# Patient Record
Sex: FEMALE | Race: WHITE | Employment: OTHER | URBAN - METROPOLITAN AREA
[De-identification: names, ages, dates, MRNs, and addresses within clinical notes are randomized per-mention and may not be internally consistent; named-entity substitution may affect disease eponyms.]

---

## 2020-10-21 ENCOUNTER — LAB ENCOUNTER (OUTPATIENT)
Dept: LAB | Age: 65
End: 2020-10-21
Payer: COMMERCIAL

## 2020-10-21 DIAGNOSIS — E07.9 DISORDER OF THYROID: Primary | ICD-10-CM

## 2020-10-21 PROCEDURE — 84443 ASSAY THYROID STIM HORMONE: CPT

## 2020-10-21 PROCEDURE — 36415 COLL VENOUS BLD VENIPUNCTURE: CPT

## 2021-01-26 PROBLEM — E03.9 ACQUIRED HYPOTHYROIDISM: Status: ACTIVE | Noted: 2021-01-26

## 2021-01-26 PROBLEM — Z52.008 BLOOD DONOR: Status: ACTIVE | Noted: 2021-01-26

## 2021-07-30 ENCOUNTER — TELEPHONE (OUTPATIENT)
Dept: ORTHOPEDICS CLINIC | Facility: CLINIC | Age: 66
End: 2021-07-30

## 2021-07-30 DIAGNOSIS — M25.552 HIP PAIN, LEFT: Primary | ICD-10-CM

## 2021-07-30 NOTE — TELEPHONE ENCOUNTER
New patient with left groin pain for about 6 months. She has not had any imaging. Please order xrays.

## 2021-09-09 ENCOUNTER — OFFICE VISIT (OUTPATIENT)
Dept: ORTHOPEDICS CLINIC | Facility: CLINIC | Age: 66
End: 2021-09-09
Payer: MEDICARE

## 2021-09-09 VITALS — HEIGHT: 68 IN | BODY MASS INDEX: 20.46 KG/M2 | WEIGHT: 135 LBS

## 2021-09-09 DIAGNOSIS — M76.892 ADDUCTOR TENDINITIS OF LEFT HIP: Primary | ICD-10-CM

## 2021-09-09 DIAGNOSIS — M16.12 PRIMARY OSTEOARTHRITIS OF LEFT HIP: ICD-10-CM

## 2021-09-09 PROCEDURE — 3008F BODY MASS INDEX DOCD: CPT | Performed by: ORTHOPAEDIC SURGERY

## 2021-09-09 PROCEDURE — 99203 OFFICE O/P NEW LOW 30 MIN: CPT | Performed by: ORTHOPAEDIC SURGERY

## 2021-09-09 NOTE — PROGRESS NOTES
[facility] New Patient Note    CC: Patient presents with:  Groin Pain: Patient is here for left keshav pain. Patient states that she has had a history of this problem but the pain started getting worse about 3 months ago.        HPI: The patient i

## 2021-09-09 NOTE — PROGRESS NOTES
[facility] New Patient Note    CC: Patient presents with:  Groin Pain: Patient is here for left keshav pain. Patient states that she has had a history of this problem but the pain started getting worse about 3 months ago.        HPI: The patient i lb (61.2 kg)   BMI 20.53 kg/m²   On examination she is a thin healthy-appearing 51-year-old female in no distress. Gait is nonantalgic. No Trendelenburg sign is noted. There is excellent lumbar flexibility without tenderness.   Hip range of motion is walt temperature to participate in activities that may prolong her condition. Unfortunately it may be several months before symptoms subside. Patient expressed understanding and appreciation and will modify her activities in hopes of steady improvement.   If s

## 2021-09-15 ENCOUNTER — OFFICE VISIT (OUTPATIENT)
Dept: PODIATRY CLINIC | Facility: CLINIC | Age: 66
End: 2021-09-15
Payer: MEDICARE

## 2021-09-15 DIAGNOSIS — M79.674 TOE PAIN, RIGHT: ICD-10-CM

## 2021-09-15 DIAGNOSIS — L84 PRE-ULCERATIVE CORN OR CALLOUS: ICD-10-CM

## 2021-09-15 DIAGNOSIS — M20.41 HAMMER TOES OF BOTH FEET: Primary | ICD-10-CM

## 2021-09-15 DIAGNOSIS — M20.42 HAMMER TOES OF BOTH FEET: Primary | ICD-10-CM

## 2021-09-15 PROCEDURE — 99203 OFFICE O/P NEW LOW 30 MIN: CPT | Performed by: PODIATRIST

## 2021-09-15 NOTE — PROGRESS NOTES
Taina Diamond is a 77year old female. Patient presents with:  New Patient: right 5th toe corn is painful with shoes. pain 8/10 when wearing shoes.         HPI:     This 31-year-old female patient presents clinic today complaining of a painful corn Exam   GENERAL: well developed, well nourished, in no apparent distress  EXTREMITIES:   1. Integument: Normal skin temperature and turgor. Minimal preulcerative (grade 0) hyperkeratotic buildup is noted at the dorsal lateral aspect of her fifth digit.   No discontinue its use with any associated irritation/pressure/pain/other issues. Patient would like to consider surgical correction of the toe. She will make an appointment for 1 month for surgical consult with Dr. Shannan Gonzalez or Dr Misty Rojas.   She will monitor th

## 2021-09-20 ENCOUNTER — TELEPHONE (OUTPATIENT)
Dept: ORTHOPEDICS CLINIC | Facility: CLINIC | Age: 66
End: 2021-09-20

## 2021-09-20 DIAGNOSIS — M76.892 ADDUCTOR TENDINITIS OF LEFT HIP: Primary | ICD-10-CM

## 2021-09-20 NOTE — TELEPHONE ENCOUNTER
· Pt is requesting a referral for PT  · She was told she did not need one but PT is asking for one  · Fax: [phone] - [facility]

## 2021-09-24 NOTE — TELEPHONE ENCOUNTER
Halima HU PA-C  You 22 hours ago (9:28 AM)       Juan Pablo Kendrick,     PT is fine, 2 times a week for 4 weeks for ROM, strengthening, modalities and HEP.  Thanks!     Message text      PT orders placed per above and faxed to pt's preferred [facility] facility

## 2022-02-17 PROBLEM — D64.9 ANEMIA, UNSPECIFIED TYPE: Status: ACTIVE | Noted: 2022-02-17

## 2022-02-17 PROBLEM — E46 PROTEIN-CALORIE MALNUTRITION, UNSPECIFIED SEVERITY (HCC): Status: ACTIVE | Noted: 2022-02-17

## 2022-02-17 PROBLEM — E78.00 HYPERCHOLESTEROLEMIA: Status: ACTIVE | Noted: 2022-02-17

## 2022-02-17 PROBLEM — G47.00 INSOMNIA, UNSPECIFIED TYPE: Status: ACTIVE | Noted: 2022-02-17

## 2023-02-23 ENCOUNTER — TELEPHONE (OUTPATIENT)
Dept: ORTHOPEDICS CLINIC | Facility: CLINIC | Age: 68
End: 2023-02-23

## 2023-02-23 DIAGNOSIS — M25.552 LEFT HIP PAIN: Primary | ICD-10-CM

## 2023-04-03 ENCOUNTER — TELEPHONE (OUTPATIENT)
Dept: ORTHOPEDICS CLINIC | Facility: CLINIC | Age: 68
End: 2023-04-03

## 2023-04-04 ENCOUNTER — HOSPITAL ENCOUNTER (OUTPATIENT)
Dept: GENERAL RADIOLOGY | Age: 68
Discharge: HOME | End: 2023-04-04
Attending: ORTHOPAEDIC SURGERY
Payer: MEDICARE

## 2023-04-04 ENCOUNTER — OFFICE VISIT (OUTPATIENT)
Dept: ORTHOPEDICS CLINIC | Facility: CLINIC | Age: 68
End: 2023-04-04
Payer: MEDICARE

## 2023-04-04 VITALS — HEIGHT: 68 IN | BODY MASS INDEX: 19.4 KG/M2 | WEIGHT: 128 LBS

## 2023-04-04 DIAGNOSIS — M25.552 LEFT HIP PAIN: ICD-10-CM

## 2023-04-04 DIAGNOSIS — M16.12 PRIMARY OSTEOARTHRITIS OF LEFT HIP: Primary | ICD-10-CM

## 2023-04-04 PROCEDURE — 99213 OFFICE O/P EST LOW 20 MIN: CPT | Performed by: ORTHOPAEDIC SURGERY

## 2023-04-04 PROCEDURE — 3008F BODY MASS INDEX DOCD: CPT | Performed by: ORTHOPAEDIC SURGERY

## 2023-04-04 PROCEDURE — 73502 X-RAY EXAM HIP UNI 2-3 VIEWS: CPT | Performed by: ORTHOPAEDIC SURGERY

## 2023-04-04 PROCEDURE — 1125F AMNT PAIN NOTED PAIN PRSNT: CPT | Performed by: ORTHOPAEDIC SURGERY

## 2023-04-04 RX ORDER — TRIAMCINOLONE ACETONIDE 40 MG/ML
40 INJECTION, SUSPENSION INTRA-ARTICULAR; INTRAMUSCULAR ONCE
Status: DISCONTINUED | OUTPATIENT
Start: 2023-04-04 | End: 2023-04-04

## 2023-04-04 NOTE — PROGRESS NOTES
Patient is a 70-year-old white female here for evaluation of her left hip. Patient denies any recent trauma. Patient states the problems been going on probably for a couple years now. Progressively worse. Patient states the pain is predominantly in the groin. Patient states the pain is increased with range of motion of her left hip. Patient's exam shows her to have moderate pain with 10 to 15 degrees of internal rotation and external rotation of her left hip. Patient has about 90 degrees of flexion. Patient's gait is mildly antalgic. Good range of motion of her right hip. No pain with passive range of motion of her right hip. Neurologically intact lower extremities to light touch. Motor exam grossly 5/5. X-rays of her left hip shows mild joint space narrowing. Moderate osteophyte formation at the margins. Right hip shows well-maintained joint space and no spurring. Impression is that of degenerative arthritis of the left hip with mild to moderate contracture. Recommendation is for her to go ahead and see one of the physiatrist for a hip injection under fluoroscopy. Patient understands that she might need to consider hip replacement surgery in the future. I have advised the patient to avoid higher impact activities. Also wanted to focus on range of motion exercises post injection but not forceful motions. Follow-up with me will be as needed.

## 2023-04-05 ENCOUNTER — OFFICE VISIT (OUTPATIENT)
Dept: PHYSICAL MEDICINE AND REHAB | Facility: CLINIC | Age: 68
End: 2023-04-05
Payer: MEDICARE

## 2023-04-05 ENCOUNTER — TELEPHONE (OUTPATIENT)
Dept: PHYSICAL MEDICINE AND REHAB | Facility: CLINIC | Age: 68
End: 2023-04-05

## 2023-04-05 VITALS
WEIGHT: 128 LBS | BODY MASS INDEX: 19.4 KG/M2 | DIASTOLIC BLOOD PRESSURE: 60 MMHG | HEIGHT: 68 IN | SYSTOLIC BLOOD PRESSURE: 120 MMHG

## 2023-04-05 DIAGNOSIS — M16.12 PRIMARY OSTEOARTHRITIS OF LEFT HIP: Primary | ICD-10-CM

## 2023-04-05 PROCEDURE — 3078F DIAST BP <80 MM HG: CPT | Performed by: PHYSICAL MEDICINE & REHABILITATION

## 2023-04-05 PROCEDURE — 3008F BODY MASS INDEX DOCD: CPT | Performed by: PHYSICAL MEDICINE & REHABILITATION

## 2023-04-05 PROCEDURE — 3074F SYST BP LT 130 MM HG: CPT | Performed by: PHYSICAL MEDICINE & REHABILITATION

## 2023-04-05 PROCEDURE — 99204 OFFICE O/P NEW MOD 45 MIN: CPT | Performed by: PHYSICAL MEDICINE & REHABILITATION

## 2023-04-05 RX ORDER — MELOXICAM 15 MG/1
TABLET ORAL
Qty: 30 TABLET | Refills: 0 | Status: SHIPPED | OUTPATIENT
Start: 2023-04-05

## 2023-04-05 NOTE — TELEPHONE ENCOUNTER
Initiated authorization for  Left hip joint steroid injection under fluoroscopy CPT 47476, 68540 dx:M16.12 to be done at [address] [place] [address] with [employer]  Status: Approved-authorization is not required per health plan

## 2023-04-06 NOTE — TELEPHONE ENCOUNTER
Initiated new authorization for Left hip joint steroid injection under fluoroscopy CPT 63375, 93208 dx:M16.12 to be done at [facility] with [facility]  Status: Approved-authorization is not required per health plan

## 2023-04-17 ENCOUNTER — OFFICE VISIT (OUTPATIENT)
Dept: SURGERY | Facility: CLINIC | Age: 68
End: 2023-04-17
Payer: MEDICARE

## 2023-04-17 DIAGNOSIS — M16.12 PRIMARY OSTEOARTHRITIS OF LEFT HIP: Primary | ICD-10-CM

## 2023-04-17 NOTE — PROCEDURES
left intra-articular hip injection    Indication: left hip osteoarthritis    Description of procedure: After discussion of the risks and benefits of the procedure, informed consent was signed and the patient was marked to confirm the correct hip to be injected. The patient was then brought to the procedure room. The patient was positioned in the right sidelying position. The left lateral hip was prepped and draped in the usual sterile manner using chlorhexidine. Under fluoroscopy the head of the femur was identified in the lateral view. The skin and soft tissue was anesthetized with 1% lidocaine without preservative. A 22g 3.5 inch needle was then advanced towards the head of the left femur. Under AP view the needle was advanced to the junction of the neck and head of the left femur until the needle was felt to advance past the capsule. Under live fluroscopic imaging 0.5ml was injected and outlined the hip joint after negative aspiration for heme and air. No paresthesias were elicited. Following confirmation of proper placement of the needle with dye, 40mg of kenalog was injected with 4ml of 1% PF lidocaine for a total injectate of 5ml. The needle was then withdrawn. Complications: No immediate complications  Follow up: 4-6 weeks.     Soto Yadav   Physical Medicine and [address] [place] [address]

## 2024-01-10 ENCOUNTER — OFFICE VISIT (OUTPATIENT)
Dept: PHYSICAL MEDICINE AND REHAB | Facility: CLINIC | Age: 69
End: 2024-01-10
Payer: MEDICARE

## 2024-01-10 VITALS — OXYGEN SATURATION: 98 % | BODY MASS INDEX: 21 KG/M2 | HEART RATE: 77 BPM | WEIGHT: 135 LBS

## 2024-01-10 DIAGNOSIS — R20.2 NUMBNESS AND TINGLING OF LEFT LEG: ICD-10-CM

## 2024-01-10 DIAGNOSIS — M16.12 PRIMARY OSTEOARTHRITIS OF LEFT HIP: Primary | ICD-10-CM

## 2024-01-10 DIAGNOSIS — R20.0 NUMBNESS AND TINGLING OF LEFT LEG: ICD-10-CM

## 2024-01-10 PROCEDURE — 1125F AMNT PAIN NOTED PAIN PRSNT: CPT | Performed by: PHYSICAL MEDICINE & REHABILITATION

## 2024-01-10 PROCEDURE — 1160F RVW MEDS BY RX/DR IN RCRD: CPT | Performed by: PHYSICAL MEDICINE & REHABILITATION

## 2024-01-10 PROCEDURE — 99214 OFFICE O/P EST MOD 30 MIN: CPT | Performed by: PHYSICAL MEDICINE & REHABILITATION

## 2024-01-10 PROCEDURE — 1159F MED LIST DOCD IN RCRD: CPT | Performed by: PHYSICAL MEDICINE & REHABILITATION

## 2024-01-10 RX ORDER — NAPROXEN 500 MG/1
TABLET ORAL
Qty: 30 TABLET | Refills: 0 | Status: SHIPPED | OUTPATIENT
Start: 2024-01-10

## 2024-02-12 ENCOUNTER — TELEPHONE (OUTPATIENT)
Dept: CASE MANAGEMENT | Age: 69
End: 2024-02-12

## 2024-02-12 NOTE — TELEPHONE ENCOUNTER
Per instructions listed below Media uploaded states MRI L SPINE W/O CONTRAST case has been approved.       And if notice listed below is \"not a denial of coverage notice. This notice is an opportunity to provide  additional information, not previously provided, or to engage in a Uojx-ab-Zboo discussion,  prior to issuing a denial decision.\"       Instructions unclear as to why a peer to peer is required at this time. Is the MRI denied?

## 2024-02-12 NOTE — TELEPHONE ENCOUNTER
MRI HIPS, LEFT (FXF=[id number])  is still pending with the health plan. Patient is scheduled for MRI 2/14/2024.    Letter received from [employer]:     Urgent Action Needed: Information needed in order to approve request for member [employer]. Respond Before 2/17/2024    This notice is not a denial of coverage notice. This notice is an opportunity to provide  additional information, not previously provided, or to engage in a Uerx-ji-Lbpr discussion,  prior to issuing a denial decision.    See attached letter for details.   A copy of the letter is located under Chart Review > Media > Document type 'Referral Attachment' Be sure to uncheck “CLINICAL INFO ONLY” to make viewable.  You may also schedule a Lmer-uu-Lckl discussion regarding the case. You may schedule a discussion with a Medical Director by calling [phone], select the prompt associated with the request type and enter in reference number [id number].    Regards,    Carmela GOYAL   Referral Specialist

## 2024-02-16 ENCOUNTER — HOSPITAL ENCOUNTER (OUTPATIENT)
Dept: MRI IMAGING | Facility: HOSPITAL | Age: 69
Discharge: HOME | End: 2024-02-16
Attending: PHYSICAL MEDICINE & REHABILITATION
Payer: MEDICARE

## 2024-02-16 DIAGNOSIS — R20.0 NUMBNESS AND TINGLING OF LEFT LEG: ICD-10-CM

## 2024-02-16 DIAGNOSIS — M16.12 PRIMARY OSTEOARTHRITIS OF LEFT HIP: ICD-10-CM

## 2024-02-16 DIAGNOSIS — R20.2 NUMBNESS AND TINGLING OF LEFT LEG: ICD-10-CM

## 2024-02-16 PROCEDURE — 72148 MRI LUMBAR SPINE W/O DYE: CPT | Performed by: PHYSICAL MEDICINE & REHABILITATION

## 2024-03-23 ENCOUNTER — HOSPITAL ENCOUNTER (OUTPATIENT)
Dept: MRI IMAGING | Age: 69
Discharge: HOME | End: 2024-03-23
Attending: PHYSICAL MEDICINE & REHABILITATION
Payer: MEDICARE

## 2024-03-23 DIAGNOSIS — R20.2 NUMBNESS AND TINGLING OF LEFT LEG: ICD-10-CM

## 2024-03-23 DIAGNOSIS — R20.0 NUMBNESS AND TINGLING OF LEFT LEG: ICD-10-CM

## 2024-03-23 DIAGNOSIS — M16.12 PRIMARY OSTEOARTHRITIS OF LEFT HIP: ICD-10-CM

## 2024-03-23 PROCEDURE — 73721 MRI JNT OF LWR EXTRE W/O DYE: CPT | Performed by: PHYSICAL MEDICINE & REHABILITATION

## 2024-03-25 ENCOUNTER — TELEPHONE (OUTPATIENT)
Dept: PHYSICAL MEDICINE AND REHAB | Facility: CLINIC | Age: 69
End: 2024-03-25

## 2024-03-25 DIAGNOSIS — M16.12 PRIMARY OSTEOARTHRITIS OF LEFT HIP: Primary | ICD-10-CM

## 2024-03-27 ENCOUNTER — PATIENT MESSAGE (OUTPATIENT)
Dept: PHYSICAL MEDICINE AND REHAB | Facility: CLINIC | Age: 69
End: 2024-03-27

## 2024-03-29 NOTE — TELEPHONE ENCOUNTER
From: Ibeth Silva-Kathy  To: Byron Atwood  Sent: 3/27/2024 6:59 AM CDT  Subject: Left hip    I have questions about the diagnosis of my left hip and hope you can call me or schedule a follow up visit.  Thank you  [phone]

## 2024-04-01 ENCOUNTER — TELEPHONE (OUTPATIENT)
Dept: PHYSICAL THERAPY | Facility: HOSPITAL | Age: 69
End: 2024-04-01

## 2024-04-11 ENCOUNTER — MED REC SCAN ONLY (OUTPATIENT)
Dept: PHYSICAL MEDICINE AND REHAB | Facility: CLINIC | Age: 69
End: 2024-04-11

## 2024-04-19 ENCOUNTER — APPOINTMENT (OUTPATIENT)
Dept: PHYSICAL THERAPY | Facility: HOSPITAL | Age: 69
End: 2024-04-19
Attending: PHYSICAL MEDICINE & REHABILITATION
Payer: MEDICARE

## 2024-04-26 ENCOUNTER — APPOINTMENT (OUTPATIENT)
Dept: PHYSICAL THERAPY | Facility: HOSPITAL | Age: 69
End: 2024-04-26
Attending: PHYSICAL MEDICINE & REHABILITATION
Payer: MEDICARE

## 2024-05-02 ENCOUNTER — APPOINTMENT (OUTPATIENT)
Dept: PHYSICAL THERAPY | Facility: HOSPITAL | Age: 69
End: 2024-05-02
Attending: PHYSICAL MEDICINE & REHABILITATION
Payer: MEDICARE

## 2024-05-03 ENCOUNTER — PATIENT MESSAGE (OUTPATIENT)
Dept: PHYSICAL MEDICINE AND REHAB | Facility: CLINIC | Age: 69
End: 2024-05-03

## 2024-05-03 DIAGNOSIS — M16.12 PRIMARY OSTEOARTHRITIS OF LEFT HIP: Primary | ICD-10-CM

## 2024-05-03 NOTE — TELEPHONE ENCOUNTER
From: Ibeth Silva-Kathy  To: Byron Atwood  Sent: 5/3/2024 7:24 AM CDT  Subject: Left hip    I have an appointment next week but think I need to reschedule. I am continuing to improve doing PT so I want to ask for more visits .

## 2024-05-08 ENCOUNTER — MED REC SCAN ONLY (OUTPATIENT)
Dept: PHYSICAL MEDICINE AND REHAB | Facility: CLINIC | Age: 69
End: 2024-05-08

## 2024-05-09 ENCOUNTER — APPOINTMENT (OUTPATIENT)
Dept: PHYSICAL THERAPY | Facility: HOSPITAL | Age: 69
End: 2024-05-09
Attending: PHYSICAL MEDICINE & REHABILITATION
Payer: MEDICARE

## 2024-05-16 ENCOUNTER — APPOINTMENT (OUTPATIENT)
Dept: PHYSICAL THERAPY | Facility: HOSPITAL | Age: 69
End: 2024-05-16
Attending: PHYSICAL MEDICINE & REHABILITATION
Payer: MEDICARE

## 2024-05-23 ENCOUNTER — APPOINTMENT (OUTPATIENT)
Dept: PHYSICAL THERAPY | Facility: HOSPITAL | Age: 69
End: 2024-05-23
Attending: PHYSICAL MEDICINE & REHABILITATION
Payer: MEDICARE

## 2024-05-30 ENCOUNTER — APPOINTMENT (OUTPATIENT)
Dept: PHYSICAL THERAPY | Facility: HOSPITAL | Age: 69
End: 2024-05-30
Attending: PHYSICAL MEDICINE & REHABILITATION
Payer: MEDICARE

## 2024-06-06 ENCOUNTER — APPOINTMENT (OUTPATIENT)
Dept: PHYSICAL THERAPY | Facility: HOSPITAL | Age: 69
End: 2024-06-06
Attending: PHYSICAL MEDICINE & REHABILITATION
Payer: MEDICARE

## 2025-05-21 ENCOUNTER — HOSPITAL ENCOUNTER (OUTPATIENT)
Dept: GENERAL RADIOLOGY | Age: 70
Discharge: HOME | End: 2025-05-21
Attending: PHYSICAL MEDICINE & REHABILITATION
Payer: MEDICARE

## 2025-05-21 ENCOUNTER — OFFICE VISIT (OUTPATIENT)
Dept: PHYSICAL MEDICINE AND REHAB | Facility: CLINIC | Age: 70
End: 2025-05-21
Payer: MEDICARE

## 2025-05-21 VITALS — BODY MASS INDEX: 21 KG/M2 | WEIGHT: 140 LBS

## 2025-05-21 DIAGNOSIS — M16.12 PRIMARY OSTEOARTHRITIS OF LEFT HIP: Primary | ICD-10-CM

## 2025-05-21 DIAGNOSIS — M16.12 PRIMARY OSTEOARTHRITIS OF LEFT HIP: ICD-10-CM

## 2025-05-21 PROCEDURE — 99214 OFFICE O/P EST MOD 30 MIN: CPT | Performed by: PHYSICAL MEDICINE & REHABILITATION

## 2025-05-21 PROCEDURE — 1125F AMNT PAIN NOTED PAIN PRSNT: CPT | Performed by: PHYSICAL MEDICINE & REHABILITATION

## 2025-05-21 PROCEDURE — 1160F RVW MEDS BY RX/DR IN RCRD: CPT | Performed by: PHYSICAL MEDICINE & REHABILITATION

## 2025-05-21 PROCEDURE — 1159F MED LIST DOCD IN RCRD: CPT | Performed by: PHYSICAL MEDICINE & REHABILITATION

## 2025-05-21 PROCEDURE — 73502 X-RAY EXAM HIP UNI 2-3 VIEWS: CPT | Performed by: PHYSICAL MEDICINE & REHABILITATION

## 2025-05-21 RX ORDER — TRAMADOL HYDROCHLORIDE 50 MG/1
50 TABLET ORAL 2 TIMES DAILY PRN
Qty: 14 TABLET | Refills: 0 | Status: SHIPPED | OUTPATIENT
Start: 2025-05-21

## 2025-05-21 RX ORDER — METHYLPREDNISOLONE 4 MG/1
TABLET ORAL
Qty: 1 EACH | Refills: 0 | Status: SHIPPED | OUTPATIENT
Start: 2025-05-21

## 2025-05-21 NOTE — PROGRESS NOTES
The following individual(s) verbally consented to be recorded using ambient AI listening technology and understand that they can each withdraw their consent to this listening technology at any point by asking the clinician to turn off or pause the recording:    Patient name: Ibeth HAND Krystyna

## 2025-05-21 NOTE — PATIENT INSTRUCTIONS
If the time comes to see a hip surgeon would recommend you see Dr. Gabriel Santiago.     You will hear back from me or my staff after I have reviewed the Xrays.    Consider aquatic exercise or elliptical; ok to do yoga, squats and lunges if not painful.

## 2025-05-21 NOTE — PROGRESS NOTES
Progress note    C/C:   Chief Complaint   Patient presents with    Follow - Up     LOV 1/10/24 L hip pain. No relief with injections or physical therapy. Ibuprofen, aleve PRN - \"doesn't even touch\" the pain. CPL: 5/10. N/t in left leg with intermittent radiating to foot.       HPI: 70 year old femalep resnts for follow up. Persistent left buttock/SIJ pain radiating down the leg, with numbness and tingling in the foot intermittently. Persistent despite rest. Began with groin pain, but currently only has groin pain externally rotating the leg. Numbness can be the lateral leg. Underwent MRIs lumbar spine, left hip since last visit.  She remains quite active, doing exercise classes, yoga.  She is not overly limited in walking.  Has to get in and out of a car correctly; if she is not careful how she does not then she will provoke left buttock/thigh pain.    Pertinent allergies: Allergies[1]     Physical exam:  Wt 140 lb (63.5 kg)   BMI 21.29 kg/m²      Lumbar spine exam:    No pain with lumbar flexion. No pain with lumbar extension. She displays good range of motion not just for her age but also for the general population.  5/5 LE strength b/l, with left buttock/lateral thigh pain provoked with resisted left hip flexion  SILT b/l LE  2/4 quad, gastrocs reflexes b/l  Seated slump test -  SLR - bilaterally    Left hip flexion 120 degrees, pain at end range in the left groin. Pain with flexion+internal rotation of the left hip. Pain with external rotation of the left hip    Imaging:   FINDINGS:  There is normal lumbar lordosis with anatomic alignment.  Vertebral body heights are well-maintained.  Minimal degenerative disc space loss, mild disc desiccation, minimal endplate spurring, and minimal degenerative endplate marrow signal changes  scattered in the lumbar spine, most pronounced at L1-2 and L2-3.  No focal worrisome marrow signal abnormality is seen.     The distal spinal cord and conus medullaris have a normal  signal and morphology.  The conus medullaris terminates at the upper L2 level.  The roots of the cauda equina are unremarkable.  Tarlov cysts along the sacrum.  The paraspinal soft tissues are  unremarkable.  Incidental probable perineural cyst within the right neural foramen at T10-11.  Incidental duplicated IVC.     T12-L1 and L1-2:  Minimal diffuse disc bulges. There is no significant spinal canal or neural foraminal stenosis.     L2-3:  Mild diffuse disc bulge. There is no significant spinal canal or neural foraminal stenosis.     L3-4:  Diffuse disc bulge with a small superimposed left foraminal disc protrusion. There is no significant spinal canal or neural foraminal stenosis.     L4-5:  Diffuse disc bulge. There is no significant spinal canal or neural foraminal stenosis.     L5-S1:  Minimal diffuse disc bulge with mild facet arthropathy. There is no significant spinal canal or neural foraminal stenosis.                      Impression   CONCLUSION:       1. Mild degenerative changes in the lumbar spine as above without significant spinal canal or neural foraminal stenosis at any level.     2. Mild facet arthropathy at L5-S1.     MRI left hip 3/23/24:  FINDINGS:    There is no evidence of an acute process.  There is moderate bilateral hip osteoarthritis with mild to moderate bilateral hip joint effusions.  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  There is a similar appearance of the   right acetabular labrum, however this is not as clearly delineated due to the study being focused more on the left hip.     No acute muscular strains, edema or atrophy noted.  No significant tendinopathy identified.  The visualized intrapelvic structures are unremarkable.                            Impression   CONCLUSION:    1. There is moderate bilateral hip osteoarthritis with mild to moderate bilateral hip joint effusions.  2. 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  There are suspected to be similar findings of the right hip which are not as clearly delineated due to the study being focused on the left hip.       Assessment and plan  Primary osteoarthritis of left hip  Numbness in left foot    The more bothersome symptoms relate to symptomatic osteoarthritis of the left hip. There are no significant findings on MRI of the lumbar spine that I would expect to cause a left lumbar radiculopathy. Recommend medrol dosepack, repeat XR left hip to include weightbearing views to assess for stability or progression of OA, given that it has now been over a year since last visit. That being said her range of motion of not only the lumbar spine but also the left hip are relatively preserved, with the exception of mild decreased external rotation of the left hip. She was also prescribed tramadol 50mg no more than BID PRN severe pain. Instructed her to consider aquatic exercise, avoid extremes of range of motion of the left hip; ok to continue squat and lunge exercises if not painful. I would recommend she avoid running for aerobic exercise.     Other consideration is EMG of the left lower extremity given the numbness and radicular appearing nature of her symptoms, though again the MRI findings of the lumbar spine were quite mild.     Patient to be contacted after XR reviewed.     Byron Atwood DO  Physical Medicine and Rehabilitation  [facility]       [1] No Known Allergies

## 2025-05-22 ENCOUNTER — TELEPHONE (OUTPATIENT)
Dept: ORTHOPEDICS CLINIC | Facility: CLINIC | Age: 70
End: 2025-05-22

## 2025-05-22 NOTE — TELEPHONE ENCOUNTER
Patient scheduled via Mychart for left hip pain.    Future Appointments   Date Time Provider Department Center   7/14/2025  8:20 AM Gabriel Santiago MD [facility] [address]     Please advise if imaging is needed.

## (undated) NOTE — LETTER
[facility], [address] [place] [address]   Date:   1/10/2024     Name:   Ibeth Greenwood    YOB: 1955   MRN:   [id number]       WHERE IS YOUR PAIN NOW?  Blaise the areas on your body where you feel the described sensations.  Use the appropriate symbol.  Blaise the areas of radiation.  Include all affected areas.  Just to complete the picture, please draw in the face.     ACHE:  ^ ^ ^   NUMBNESS:  0000   PINS & NEEDLES:  = = = =                              ^ ^ ^                       0000              = = = =                                    ^ ^ ^                       0000            = = = =      BURNING:  XXXX   STABBING: ////                  XXXX                ////                         XXXX          ////     Please blaise the line below indicating your degree of pain right now  with 0 being no pain 10 being the worst pain possible.                                         0             1             2              3             4              5              6              7             8             9             10         Patient Signature:

## (undated) NOTE — LETTER
[facility], [address], [place]   Date:   4/5/2023     Name:   Shirley Oden    YOB: 1955   MRN:   [id number]       WHERE IS YOUR PAIN NOW? Blaise the areas on your body where you feel the described sensations. Use the appropriate symbol. Quan Artur the areas of radiation. Include all affected areas. Just to complete the picture, please draw in the face. ACHE:  ^ ^ ^   NUMBNESS:  0000   PINS & NEEDLES:  = = = =                              ^ ^ ^                       0000              = = = =                                    ^ ^ ^                       0000            = = = =      BURNING:  XXXX   STABBING: ////                  XXXX                ////                         XXXX          ////     Please blaise the line below indicating your degree of pain right now  with 0 being no pain 10 being the worst pain possible.                                          0             1             2              3             4              5              6              7             8             9             10         Patient Signature:

## (undated) NOTE — LETTER
WHERE IS YOUR PAIN NOW?  Blaise the areas on your body where you feel the described sensations.  Use the appropriate symbol.  Blaise the areas of radiation.  Include all affected areas.  Just to complete the picture, please draw in the face.     ACHE:  ^ ^ ^   NUMBNESS:  0000   PINS & NEEDLES:  = = = =                              ^ ^ ^                       0000              = = = =                                    ^ ^ ^                       0000            = = = =      BURNING:  XXXX   STABBING: ////                  XXXX                ////                         XXXX          ////     Please blaise the line below indicating your degree of pain right now  with 0 being no pain 10 being the worst pain possible.                                         0             1             2              3             4              5              6              7             8             9             10         Patient Signature: